# Patient Record
Sex: FEMALE | Race: BLACK OR AFRICAN AMERICAN | URBAN - METROPOLITAN AREA
[De-identification: names, ages, dates, MRNs, and addresses within clinical notes are randomized per-mention and may not be internally consistent; named-entity substitution may affect disease eponyms.]

---

## 2019-02-10 ENCOUNTER — HISTORICAL (OUTPATIENT)
Dept: ADMINISTRATIVE | Facility: HOSPITAL | Age: 19
End: 2019-02-10

## 2019-02-10 LAB
APPEARANCE, UA: ABNORMAL
BACTERIA #/AREA URNS AUTO: ABNORMAL /[HPF]
BILIRUB SERPL-MCNC: NEGATIVE MG/DL
BILIRUB UR QL STRIP: NEGATIVE
BLOOD URINE, POC: NORMAL
CLARITY, POC UA: NORMAL
COLOR UR: YELLOW
COLOR, POC UA: NORMAL
GLUCOSE (UA): NORMAL
GLUCOSE UR QL STRIP: NEGATIVE
HGB UR QL STRIP: 0.5 MG/DL
HYALINE CASTS #/AREA URNS LPF: ABNORMAL /[LPF]
KETONES UR QL STRIP: NEGATIVE
KETONES UR QL STRIP: NEGATIVE
LEUKOCYTE EST, POC UA: NORMAL
LEUKOCYTE ESTERASE UR QL STRIP: 500 LEU/UL
NITRITE UR QL STRIP: NEGATIVE
NITRITE, POC UA: POSITIVE
PH UR STRIP: 6 [PH] (ref 4.5–8)
PH, POC UA: 6.5
PROT UR QL STRIP: 100 MG/DL
PROTEIN, POC: NORMAL
RBC #/AREA URNS AUTO: ABNORMAL /[HPF]
SP GR UR STRIP: 1.01 (ref 1–1.03)
SPECIFIC GRAVITY, POC UA: 1.02
SQUAMOUS #/AREA URNS LPF: ABNORMAL /[LPF]
UROBILINOGEN UR STRIP-ACNC: NORMAL
UROBILINOGEN, POC UA: NORMAL
WBC #/AREA URNS AUTO: >=100 /HPF

## 2022-04-10 ENCOUNTER — HISTORICAL (OUTPATIENT)
Dept: ADMINISTRATIVE | Facility: HOSPITAL | Age: 22
End: 2022-04-10

## 2022-04-30 VITALS
SYSTOLIC BLOOD PRESSURE: 114 MMHG | DIASTOLIC BLOOD PRESSURE: 74 MMHG | OXYGEN SATURATION: 100 % | HEIGHT: 62 IN | WEIGHT: 117.06 LBS | BODY MASS INDEX: 21.54 KG/M2

## 2022-05-02 NOTE — HISTORICAL [facility] CERNER
This is a historical note converted from [employer]. Formatting and pictures may have been removed.  Please reference [employer] for original formatting and attached multimedia. Chief Complaint  frequent urination, with burning and pain x 2 days  History of Present Illness  Pt is a 18-year-old female, here today for?frequent urination?with burning and pain times 2 days. ?Patient states she does not get frequent UTIs, states that she had her first?one a while back and was put on Macrobid which helped. ?Denies fever, body aches, abdominal pain, flank pain.  Review of Systems  Constitutional: negative except as stated in HPI  Eye: negative except as stated in HPI  ENT: negative except as stated in HPI  Respiratory: negative except as stated in HPI  Cardiovascular: negative except as stated in HPI  Gastrointestinal: negative except as stated in HPI  Genitourinary: negative except as stated in HPI  Hema/Lymph: negative except as stated in HPI  Endocrine: negative except as stated in HPI  Immunologic: negative except as stated in HPI  Musculoskeletal: negative except as stated in HPI  Integumentary: negative except as stated in HPI  Neurologic: negative except as stated in HPI  All Other ROS_ negative except as stated in HPI  ?  ?  Physical Exam  Vitals & Measurements  T:?37.1? ?C (Oral)? HR:?51(Peripheral)? RR:?20? BP:?131/70? SpO2:?99%?  HT:?161?cm? WT:?53.5?kg? BMI:?20.64?  General: Alert and oriented, No acute distress.  Eye:? Extraocular movements are intact.  HENT: Normocephalic.  Respiratory: Lungs are clear to auscultation, Respirations are non-labored, Breath sounds are equal, Symmetrical chest wall expansion.  Cardiovascular: Normal rate, Regular rhythm, No murmur.  Gastrointestinal: Soft, Non-tender, Non-distended, Normal bowel sounds.  Genitourinary: No flank pain  Neurologic: No focal deficits, Cranial Nerves II-XII are grossly intact.  ?  Assessment/Plan  1.?Acute UTI?N39.0  POC u/a +nitri, leuk-large.?Medication as  ordered. Maintain adequate hydration. Wipe front to back.? Urine sent for C&S. ?ER precautions  Ordered:  nitrofurantoin, 100 mg = 1 cap(s), Oral, BID, X 7 day(s), # 14 cap(s), 0 Refill(s), Pharmacy: [facility] [id number]  After Hrs Visit [id number] PC, Acute UTI  Tobacco user, 02/10/19 13:38:00 CST  Office/Outpatient Visit Level 3 Established [id number] PC, Acute UTI  Tobacco user, 02/10/19 13:38:00 CST  Urinalysis with Microscopic if Indicated, Stat collect, Urine, 02/10/19 13:30:00 CST, Stop date 02/10/19 13:31:00 CST, Nurse collect, Acute UTI  Urine Culture [id number], Stat collect, 02/10/19 13:30:00 CST, Urine, Nurse collect, Stop date 02/10/19 13:31:00 CST, Acute UTI  ?  2.?Tobacco user?Z72.0  Discussed benefits of smoking cessation.  Ordered:  After Hrs Visit [id number] PC, Acute UTI  Tobacco user, 02/10/19 13:38:00 CST  Office/Outpatient Visit Level 3 Established [id number] PC, Acute UTI  Tobacco user, 02/10/19 13:38:00 CST  ?   Problem List/Past Medical History  Ongoing  Tobacco user  Historical  No qualifying data  Procedure/Surgical History  denies   Medications  azithromycin 250 mg oral tablet, 1000 mg= 4 tab(s), Oral, Once,? ?Not Taking, Completed Rx  Macrobid 100 mg oral capsule, 100 mg= 1 cap(s), Oral, BID  MEDROXYPROGESTERONE 10MG TABLETS, 10 mg= 1 tab(s), Daily  METRONIDAZOLE 500MG TABLETS, 500 mg= 1 tab(s), BID,? ?Not Taking, Completed Rx  NITROFURANTOIN MONO/MAC 100MG CAPS, 100 mg= 1 cap(s), BID,? ?Not Taking, Completed Rx  Rocephin 250 mg injection, 250 mg= 1 EA, IM, Once,? ?Not Taking, Completed Rx  TERCONAZOLE 0.4% VAGINAL CRM 45GM,? ?Not Taking, Completed Rx  VALACYCLOVIR 500MG TABLETS, 500 mg= 1 tab(s), BID,? ?Not Taking, Completed Rx  Allergies  No Known Allergies  Social History  Alcohol  Never, 02/24/2017  Substance Abuse  Never, 02/24/2017  Tobacco  Former smoker, quit more than 30 days ago, No, 02/10/2019  Current every day smoker, Cigarettes, 01/27/2018  Health Maintenance  Health  Maintenance  ???Pending?(in the next year)  ??? ??Due?  ??? ? ? ?ADL Screening due??02/10/19??and every 1??year(s)  ??? ? ? ?Alcohol Misuse Screening due??02/10/19??and every 1??year(s)  ??? ? ? ?Tetanus Vaccine due??02/10/19??and every 10??year(s)  ??? ??Due In Future?  ??? ? ? ?Smoking Cessation not due until??09/19/19??and every 1??year(s)  ???Satisfied?(in the past 1 year)  ??? ??Satisfied?  ??? ? ? ?Blood Pressure Screening on??02/10/19.??Satisfied by Kristyn Olvera LPN  ??? ? ? ?Body Mass Index Check on??02/10/19.??Satisfied by SYSTEM  ??? ? ? ?Depression Screening on??02/10/19.??Satisfied by Kristyn Olvera LPN  ??? ? ? ?Influenza Vaccine on??02/10/19.??Satisfied by Kristyn Olvera LPN  ??? ? ? ?Obesity Screening on??02/10/19.??Satisfied by Kristyn Olvera LPN  ??? ? ? ?Smoking Cessation on??09/19/18.??Satisfied by Yessica Santacruz RN  ?  ?  Diagnostic Results  Urinalysis????????  Color Ur Dipstick????????Dark yellow  Appearance Ur Dipstick????????Cloudy  pH Ur Dipstick????????6.5  Specific Gravity Ur Dipstick????????1.020  Blood Ur Dipstick????????Large  Glucose Ur Dipstick????????Negative  Ketones Ur Dipstick????????Negative  Protein Ur Dipstick????????1+ (30 mg/dl)  Bilirubin Ur Dipstick????????Negative  Urobilinogen Ur Dipstick????????2 mg/dl  Leukocytes Ur Dipstick????????Large  Nitrite Ur Dipstick????????Positive  ?